# Patient Record
(demographics unavailable — no encounter records)

---

## 2024-11-11 NOTE — PHYSICAL EXAM
[de-identified] : Constitutional:  [    ], alert and oriented, cooperative, in no acute distress.  HEENT  NC/AT.  Appearance: symmetric  Neck/Back Straight without deformity or instability.  Good ROM.  Chest/Respiratory  Respiratory effort: no intercostal retractions or use of accessory muscles. Nonlabored Breathing  Mental Status:  Judgment, insight: intact Orientation: oriented to time, place, and person  Neurological: Sensory and Motor are grossly intact throughout  Right Hip Exam: Inspection/Appearance:      Incision well healed, no erythema or drainage  Tenderness:  	Sacroiliac: [     ]  	Greater trochanter: [     ]                  ADIN Test: [   ]                 FADIR Test: [    ]  Range of Motion:                 Extension - [     ]  	Flexion - [     ]  	IR - [     ]  	ER - [     ]  	Abd - [     ]  	Add - [     ]   Stability: Normal without instability Gait: [     ]  Leg Length Discrepancy:  [     ] cm  Neurologic Exam     Motor intact including 5/5 Extensor Hallucis Longus, 5/5 Flexor Hallucis Longus, 5/5 Tibialis Anterior and 5/5 Gastrocnemius     Sensation Intact to Light Touch including Saphenous, Sural, Superficial Peroneal, Deep Peroneal, Tibial nerve distributions  Vascular Exam     Foot is warm and well perfused with 2+ Dorsalis Pedis Pulse  No pain with range of motion of the left hip or bilateral knees. No lumbar paraspinal muscle tenderness.

## 2024-11-12 NOTE — HISTORY OF PRESENT ILLNESS
[de-identified] : Wendy Hogue is a 83-year-old female presents to the office for follow-up of her right hip intramedullary nail.  Patient underwent right hip IMN on 10/29/2024.  She is currently at rehab.  Patient does have some right hip pain.  Patient was recently brought to the ER due to skin issues from her Lovenox injections.  No falls.  No fevers or chills.

## 2024-11-12 NOTE — DISCUSSION/SUMMARY
[de-identified] : Wendy Hogue is a 83-year-old female who presents to the office for evaluation of her right hip intramedullary nail.  Patient underwent surgery on 10/29/2024.  She is currently at rehab.  X-rays showed right hip intramedullary nail.  Examination showed pain with right hip range of motion.  Discussed with the patient and her son the examination and imaging findings.  Discussed with the patient the recovery of her right hip intramedullary nail, including physical therapy and DVT prophylaxis.  Discussed patient's skin issues due to Lovenox.  Patient may switch to aspirin 325 mg twice per day for a total of 6 weeks for her DVT prophylaxis.  Patient will continue to be weightbearing as tolerated on the right lower extremity.  Discussed evaluation of patient's intramedullary nail.  CT scan of the right hip was ordered.  Discussed that if there is like screw cut out, then revision surgery may be required.  Patient will follow-up in 2 weeks for reevaluation and management.  Patient and her son understanding and in agreement with the plan.  All questions answered.  Plan: -Right lower extremity: Weightbearing as tolerated -DVT prophylaxis: Aspirin 325 mg twice per day for total of 6 weeks -CT scan right hip -Follow-up in 2 weeks for reevaluation and management

## 2024-11-12 NOTE — PHYSICAL EXAM
[de-identified] : Constitutional:  83 year old female, alert and oriented, cooperative, in no acute distress.  HEENT  NC/AT.  Appearance: symmetric  Neck/Back Straight without deformity or instability.  Good ROM.  Chest/Respiratory  Respiratory effort: no intercostal retractions or use of accessory muscles. Nonlabored Breathing  Mental Status:  Judgment, insight: intact Orientation: oriented to time, place, and person  Neurological: Sensory and Motor are grossly intact throughout  Right Hip Exam: Inspection/Appearance:      Incision well healing, no erythema or drainage  Tenderness:                  ADIN Test:  Positive                 FADIR Test: Positive  Range of Motion:                 Extension - 0  	Flexion - 90 	IR - 20 	ER - 30  	Abd - 30  	Add - 30   Neurologic Exam     Motor intact including 5/5 Extensor Hallucis Longus, 5/5 Flexor Hallucis Longus, 5/5 Tibialis Anterior and 5/5 Gastrocnemius     Sensation Intact to Light Touch including Saphenous, Sural, Superficial Peroneal, Deep Peroneal, Tibial nerve distributions  Vascular Exam     Foot is warm and well perfused with 2+ Dorsalis Pedis Pulse  No pain with range of motion of the left hip or bilateral knees. No lumbar paraspinal muscle tenderness. [de-identified] : XRay:  XRays of the Pelvis (1 View) and Right Hip (2 Views) taken in the office today and reviewed with the patient. XRays demonstrate a Right Hip Intramedullary Nail. There has been no significant change in fracture alignment. (my personal interpretation)

## 2024-11-26 NOTE — HISTORY OF PRESENT ILLNESS
[de-identified] : 11/26/2024  Wendy Hogue presents to the office for follow-up of her right hip intramedullary nail.  Patient underwent surgery on 10/29/2024.  She is currently in rehab.  She does have some right hip pain.  Patient underwent her CT scan.  11/12/2024 Wendy Hogue is a 83-year-old female presents to the office for follow-up of her right hip intramedullary nail.  Patient underwent right hip IMN on 10/29/2024.  She is currently at rehab.  Patient does have some right hip pain.  Patient was recently brought to the ER due to skin issues from her Lovenox injections.  No falls.  No fevers or chills.

## 2024-11-26 NOTE — PHYSICAL EXAM
[de-identified] : Constitutional:  83 year old female, alert and oriented, cooperative, in no acute distress.  HEENT  NC/AT.  Appearance: symmetric  Neck/Back Straight without deformity or instability.  Good ROM.  Chest/Respiratory  Respiratory effort: no intercostal retractions or use of accessory muscles. Nonlabored Breathing  Mental Status:  Judgment, insight: intact Orientation: oriented to time, place, and person  Neurological: Sensory and Motor are grossly intact throughout  Right Hip Exam: Inspection/Appearance:      Incision well healing, no erythema or drainage  Tenderness:                  ADIN Test:  Positive                 FADIR Test: Positive  Range of Motion:                 Extension - 0  	Flexion - 90 	IR - 20 	ER - 30  	Abd - 30  	Add - 30   Neurologic Exam     Motor intact including 5/5 Extensor Hallucis Longus, 5/5 Flexor Hallucis Longus, 5/5 Tibialis Anterior and 5/5 Gastrocnemius     Sensation Intact to Light Touch including Saphenous, Sural, Superficial Peroneal, Deep Peroneal, Tibial nerve distributions  Vascular Exam     Foot is warm and well perfused with 2+ Dorsalis Pedis Pulse  No pain with range of motion of the left hip or bilateral knees. No lumbar paraspinal muscle tenderness. [de-identified] : XRay:  XRays of the Pelvis (1 View) and Right Hip (2 Views) taken on 11/12/24. XRays demonstrate a Right Hip Intramedullary Nail. There has been no significant change in fracture alignment. (my personal interpretation)   ACC: [id number]     EXAM:  CT PELVIS BONY ONLY   ORDERED BY: GEN SINGH  PROCEDURE DATE:  11/20/2024    INTERPRETATION:  Exam Type: CT PELVIS BONY Exam Date and Time: 11/20/2024 2:24 PM Indication: Right hip pain. Comparison: Correlate with CT abdomen and pelvis 11/9/2024. Intra-operative x-rays 10/29/2024.  TECHNIQUE: Multiplanar CT images of the pelvis were obtained without contrast. 3-D reformatted images were also obtained for evaluation.  FINDINGS: Demineralized bones. Patient is status post open reduction internal fixation at the right hip consisting of a right femur intramedullary liliya with a gamma nail traversing the femoral head neck region. The hardware is without fracture and positioning appears to be similar as compared to the intra-operative x-rays from 10/29/2024.. Additional traversing screw is seen at the distal aspect of intramedullary liliya. The hardware traverses a right femur comminuted intratrochanteric fracture. There is a presence of surrounding callus formation about the fracture.  There is bilateral hip joint osteoarthritis with multiple ossified intervertebral bodies of the left hip. Degenerative changes at the bilateral sacroiliac joints and pubic symphysis. Partially imaged lumbar spondylosis.  There is subcutaneous edema and swelling about the right lateral hip and the imaged right proximal to mid thigh. Interval decrease in the size of the subcutaneous collection and ill-defined intramuscular hematoma about the right hip region as compared to prior study. Nonspecific thickening of the cutaneous surface is seen along the inner portion of the proximal to mid thigh.  Vascular calcification is present. Calcified uterine fibroid.   IMPRESSION: Status post open reduction internal fixation of the right hip. The hardware is without fracture and positioning appears to be similar as compared to the intra-operative x-rays from 10/29/2024..  --- End of Report ---      RICKY ROSS MD This document has been electronically signed. Nov 20 2024  4:23PM

## 2024-11-26 NOTE — DISCUSSION/SUMMARY
[de-identified] : Wendy Hogue is a 83-year-old female who presents to the office for evaluation of her right hip intramedullary nail.  Patient underwent surgery on 10/29/2024.  She is currently at rehab.  Prior X-rays showed right hip intramedullary nail.  Examination showed pain with right hip range of motion.  Discussed with the patient and her son the examination and imaging findings.  Discussed with the patient the recovery of her right hip intramedullary nail, including physical therapy and DVT prophylaxis.  Discussed patient's skin issues due to Lovenox.  Patient may switch to aspirin 325 mg twice per day for a total of 6 weeks for her DVT prophylaxis.  Patient will continue to be weightbearing as tolerated on the right lower extremity.  Discussed evaluation of patient's intramedullary nail. Discussed operative and nonoperative management of patient's right hip.  Discussed that nonoperative management would be to allow for bony healing and then reevaluation of nail.  Discussed that operative management would consist of removal of nail and hip hemiarthroplasty.  Patient does not want surgery at this time.  Discussed continuing to monitor fracture alignment and screw cut out. Patient will follow-up in 2 weeks for reevaluation and management.  Patient and her son understanding and in agreement with the plan.  All questions answered.  Plan: -Right lower extremity: Weightbearing as tolerated -DVT prophylaxis: Aspirin 325 mg twice per day for total of 6 weeks -Follow-up in 2 weeks for reevaluation and management

## 2024-12-10 NOTE — HISTORY OF PRESENT ILLNESS
[de-identified] : 12/10/2024  Wendy Hogue presents to the office for follow-up of her right hip intramedullary nail.  Patient underwent surgery on 10/29/2024.  She is currently in rehab.  She does have some right lower leg pain.  She is not having significant hip pain.  Patient was recently found to have a DVT while at rehab.  She was restarted on her anticoagulation.  11/26/2024  Wendy Hogue presents to the office for follow-up of her right hip intramedullary nail.  Patient underwent surgery on 10/29/2024.  She is currently in rehab.  She does have some right hip pain.  Patient underwent her CT scan.  11/12/2024 Wendy Hogue is a 83-year-old female presents to the office for follow-up of her right hip intramedullary nail.  Patient underwent right hip IMN on 10/29/2024.  She is currently at rehab.  Patient does have some right hip pain.  Patient was recently brought to the ER due to skin issues from her Lovenox injections.  No falls.  No fevers or chills.

## 2024-12-10 NOTE — PHYSICAL EXAM
[de-identified] : Constitutional:  83 year old female, alert and oriented, cooperative, in no acute distress.  HEENT  NC/AT.  Appearance: symmetric  Neck/Back Straight without deformity or instability.  Good ROM.  Chest/Respiratory  Respiratory effort: no intercostal retractions or use of accessory muscles. Nonlabored Breathing  Mental Status:  Judgment, insight: intact Orientation: oriented to time, place, and person  Neurological: Sensory and Motor are grossly intact throughout  Right Hip Exam: Inspection/Appearance:      Incision well healing, no erythema or drainage  Tenderness:                  ADIN Test:  Positive                 FADIR Test: Positive  Range of Motion:                 Extension - 0  	Flexion - 90 	IR - 20 	ER - 30  	Abd - 30  	Add - 30   Neurologic Exam     Motor intact including 5/5 Extensor Hallucis Longus, 5/5 Flexor Hallucis Longus, 5/5 Tibialis Anterior and 5/5 Gastrocnemius     Sensation Intact to Light Touch including Saphenous, Sural, Superficial Peroneal, Deep Peroneal, Tibial nerve distributions  Vascular Exam     Foot is warm and well perfused with 2+ Dorsalis Pedis Pulse  No pain with range of motion of the left hip or bilateral knees. No lumbar paraspinal muscle tenderness. [de-identified] : XRay:  XRays of the Pelvis (1 View) and Right Hip (2 Views) taken today in the office. XRays demonstrate a Right Hip Intramedullary Nail. There has been no significant change in fracture alignment. (my personal interpretation)   ACC: [id number]     EXAM:  CT PELVIS BONY ONLY   ORDERED BY: GEN SINGH  PROCEDURE DATE:  11/20/2024    INTERPRETATION:  Exam Type: CT PELVIS BONY Exam Date and Time: 11/20/2024 2:24 PM Indication: Right hip pain. Comparison: Correlate with CT abdomen and pelvis 11/9/2024. Intra-operative x-rays 10/29/2024.  TECHNIQUE: Multiplanar CT images of the pelvis were obtained without contrast. 3-D reformatted images were also obtained for evaluation.  FINDINGS: Demineralized bones. Patient is status post open reduction internal fixation at the right hip consisting of a right femur intramedullary liliya with a gamma nail traversing the femoral head neck region. The hardware is without fracture and positioning appears to be similar as compared to the intra-operative x-rays from 10/29/2024.. Additional traversing screw is seen at the distal aspect of intramedullary liliya. The hardware traverses a right femur comminuted intratrochanteric fracture. There is a presence of surrounding callus formation about the fracture.  There is bilateral hip joint osteoarthritis with multiple ossified intervertebral bodies of the left hip. Degenerative changes at the bilateral sacroiliac joints and pubic symphysis. Partially imaged lumbar spondylosis.  There is subcutaneous edema and swelling about the right lateral hip and the imaged right proximal to mid thigh. Interval decrease in the size of the subcutaneous collection and ill-defined intramuscular hematoma about the right hip region as compared to prior study. Nonspecific thickening of the cutaneous surface is seen along the inner portion of the proximal to mid thigh.  Vascular calcification is present. Calcified uterine fibroid.   IMPRESSION: Status post open reduction internal fixation of the right hip. The hardware is without fracture and positioning appears to be similar as compared to the intra-operative x-rays from 10/29/2024..  --- End of Report ---      RICKY ROSS MD This document has been electronically signed. Nov 20 2024  4:23PM

## 2024-12-10 NOTE — DISCUSSION/SUMMARY
[de-identified] : Wendy Hogue is a 83-year-old female who presents to the office for evaluation of her right hip intramedullary nail.  Patient underwent surgery on 10/29/2024.  She is currently at rehab.  Prior X-rays showed right hip intramedullary nail.  Examination showed pain with right hip range of motion.  Discussed with the patient and her son the examination and imaging findings.  Discussed with the patient the recovery of her right hip intramedullary nail, including physical therapy.  Patient will continue to be weightbearing as tolerated on the right lower extremity.  Discussed evaluation of patient's intramedullary nail. Discussed operative and nonoperative management of patient's right hip. Discussed that due to patient's recent DVT, would continue to monitor right hip at this time.  Patient will continue her anticoagulation for her DVT. Patient will follow-up in 4 weeks for reevaluation and management.  Patient and her son understanding and in agreement with the plan.  All questions answered.  Plan: -Right lower extremity: Weightbearing as tolerated -Anticoagulation per Medicine -Follow-up in 4 weeks for reevaluation and management

## 2024-12-10 NOTE — HISTORY OF PRESENT ILLNESS
[de-identified] : 12/10/2024  Wendy Hogue presents to the office for follow-up of her right hip intramedullary nail.  Patient underwent surgery on 10/29/2024.  She is currently in rehab.  She does have some right lower leg pain.  She is not having significant hip pain.  Patient was recently found to have a DVT while at rehab.  She was restarted on her anticoagulation.  11/26/2024  Wendy Hogue presents to the office for follow-up of her right hip intramedullary nail.  Patient underwent surgery on 10/29/2024.  She is currently in rehab.  She does have some right hip pain.  Patient underwent her CT scan.  11/12/2024 Wendy Hogue is a 83-year-old female presents to the office for follow-up of her right hip intramedullary nail.  Patient underwent right hip IMN on 10/29/2024.  She is currently at rehab.  Patient does have some right hip pain.  Patient was recently brought to the ER due to skin issues from her Lovenox injections.  No falls.  No fevers or chills.

## 2024-12-10 NOTE — DISCUSSION/SUMMARY
[de-identified] : Wendy Hogue is a 83-year-old female who presents to the office for evaluation of her right hip intramedullary nail.  Patient underwent surgery on 10/29/2024.  She is currently at rehab.  Prior X-rays showed right hip intramedullary nail.  Examination showed pain with right hip range of motion.  Discussed with the patient and her son the examination and imaging findings.  Discussed with the patient the recovery of her right hip intramedullary nail, including physical therapy.  Patient will continue to be weightbearing as tolerated on the right lower extremity.  Discussed evaluation of patient's intramedullary nail. Discussed operative and nonoperative management of patient's right hip. Discussed that due to patient's recent DVT, would continue to monitor right hip at this time.  Patient will continue her anticoagulation for her DVT. Patient will follow-up in 4 weeks for reevaluation and management.  Patient and her son understanding and in agreement with the plan.  All questions answered.  Plan: -Right lower extremity: Weightbearing as tolerated -Anticoagulation per Medicine -Follow-up in 4 weeks for reevaluation and management

## 2024-12-10 NOTE — PHYSICAL EXAM
[de-identified] : Constitutional:  83 year old female, alert and oriented, cooperative, in no acute distress.  HEENT  NC/AT.  Appearance: symmetric  Neck/Back Straight without deformity or instability.  Good ROM.  Chest/Respiratory  Respiratory effort: no intercostal retractions or use of accessory muscles. Nonlabored Breathing  Mental Status:  Judgment, insight: intact Orientation: oriented to time, place, and person  Neurological: Sensory and Motor are grossly intact throughout  Right Hip Exam: Inspection/Appearance:      Incision well healing, no erythema or drainage  Tenderness:                  ADIN Test:  Positive                 FADIR Test: Positive  Range of Motion:                 Extension - 0  	Flexion - 90 	IR - 20 	ER - 30  	Abd - 30  	Add - 30   Neurologic Exam     Motor intact including 5/5 Extensor Hallucis Longus, 5/5 Flexor Hallucis Longus, 5/5 Tibialis Anterior and 5/5 Gastrocnemius     Sensation Intact to Light Touch including Saphenous, Sural, Superficial Peroneal, Deep Peroneal, Tibial nerve distributions  Vascular Exam     Foot is warm and well perfused with 2+ Dorsalis Pedis Pulse  No pain with range of motion of the left hip or bilateral knees. No lumbar paraspinal muscle tenderness. [de-identified] : XRay:  XRays of the Pelvis (1 View) and Right Hip (2 Views) taken today in the office. XRays demonstrate a Right Hip Intramedullary Nail. There has been no significant change in fracture alignment. (my personal interpretation)   ACC: [id number]     EXAM:  CT PELVIS BONY ONLY   ORDERED BY: GEN SINGH  PROCEDURE DATE:  11/20/2024    INTERPRETATION:  Exam Type: CT PELVIS BONY Exam Date and Time: 11/20/2024 2:24 PM Indication: Right hip pain. Comparison: Correlate with CT abdomen and pelvis 11/9/2024. Intra-operative x-rays 10/29/2024.  TECHNIQUE: Multiplanar CT images of the pelvis were obtained without contrast. 3-D reformatted images were also obtained for evaluation.  FINDINGS: Demineralized bones. Patient is status post open reduction internal fixation at the right hip consisting of a right femur intramedullary liliya with a gamma nail traversing the femoral head neck region. The hardware is without fracture and positioning appears to be similar as compared to the intra-operative x-rays from 10/29/2024.. Additional traversing screw is seen at the distal aspect of intramedullary liliya. The hardware traverses a right femur comminuted intratrochanteric fracture. There is a presence of surrounding callus formation about the fracture.  There is bilateral hip joint osteoarthritis with multiple ossified intervertebral bodies of the left hip. Degenerative changes at the bilateral sacroiliac joints and pubic symphysis. Partially imaged lumbar spondylosis.  There is subcutaneous edema and swelling about the right lateral hip and the imaged right proximal to mid thigh. Interval decrease in the size of the subcutaneous collection and ill-defined intramuscular hematoma about the right hip region as compared to prior study. Nonspecific thickening of the cutaneous surface is seen along the inner portion of the proximal to mid thigh.  Vascular calcification is present. Calcified uterine fibroid.   IMPRESSION: Status post open reduction internal fixation of the right hip. The hardware is without fracture and positioning appears to be similar as compared to the intra-operative x-rays from 10/29/2024..  --- End of Report ---      RICKY ROSS MD This document has been electronically signed. Nov 20 2024  4:23PM

## 2025-01-28 NOTE — PHYSICAL EXAM
[de-identified] : Constitutional:  83 year old female, alert and oriented, cooperative, in no acute distress.  HEENT  NC/AT.  Appearance: symmetric  Neck/Back Straight without deformity or instability.  Good ROM.  Chest/Respiratory  Respiratory effort: no intercostal retractions or use of accessory muscles. Nonlabored Breathing  Mental Status:  Judgment, insight: intact Orientation: oriented to time, place, and person  Neurological: Sensory and Motor are grossly intact throughout  Right Hip Exam: Inspection/Appearance:      Incision well healing, no erythema or drainage  Tenderness:                  ADIN Test:  Negative                 FADIR Test: Negative  Range of Motion:                 Extension - 0  	Flexion - 90 	IR - 20 	ER - 30  	Abd - 30  	Add - 30   Neurologic Exam     Motor intact including 5/5 Extensor Hallucis Longus, 5/5 Flexor Hallucis Longus, 5/5 Tibialis Anterior and 5/5 Gastrocnemius     Sensation Intact to Light Touch including Saphenous, Sural, Superficial Peroneal, Deep Peroneal, Tibial nerve distributions  Vascular Exam     Foot is warm and well perfused with 2+ Dorsalis Pedis Pulse  No pain with range of motion of the left hip or bilateral knees. No lumbar paraspinal muscle tenderness. [de-identified] : XRay:  XRays of the Pelvis (1 View) and Right Hip (2 Views) taken today in the office. XRays demonstrate a Right Hip Intramedullary Nail. There has been no significant change in fracture alignment. (my personal interpretation)   ACC: [id number]     EXAM:  CT PELVIS BONY ONLY   ORDERED BY: GEN SINGH  PROCEDURE DATE:  11/20/2024    INTERPRETATION:  Exam Type: CT PELVIS BONY Exam Date and Time: 11/20/2024 2:24 PM Indication: Right hip pain. Comparison: Correlate with CT abdomen and pelvis 11/9/2024. Intra-operative x-rays 10/29/2024.  TECHNIQUE: Multiplanar CT images of the pelvis were obtained without contrast. 3-D reformatted images were also obtained for evaluation.  FINDINGS: Demineralized bones. Patient is status post open reduction internal fixation at the right hip consisting of a right femur intramedullary liliya with a gamma nail traversing the femoral head neck region. The hardware is without fracture and positioning appears to be similar as compared to the intra-operative x-rays from 10/29/2024.. Additional traversing screw is seen at the distal aspect of intramedullary liliya. The hardware traverses a right femur comminuted intratrochanteric fracture. There is a presence of surrounding callus formation about the fracture.  There is bilateral hip joint osteoarthritis with multiple ossified intervertebral bodies of the left hip. Degenerative changes at the bilateral sacroiliac joints and pubic symphysis. Partially imaged lumbar spondylosis.  There is subcutaneous edema and swelling about the right lateral hip and the imaged right proximal to mid thigh. Interval decrease in the size of the subcutaneous collection and ill-defined intramuscular hematoma about the right hip region as compared to prior study. Nonspecific thickening of the cutaneous surface is seen along the inner portion of the proximal to mid thigh.  Vascular calcification is present. Calcified uterine fibroid.   IMPRESSION: Status post open reduction internal fixation of the right hip. The hardware is without fracture and positioning appears to be similar as compared to the intra-operative x-rays from 10/29/2024..  --- End of Report ---      RICKY ROSS MD This document has been electronically signed. Nov 20 2024  4:23PM

## 2025-01-28 NOTE — DISCUSSION/SUMMARY
[de-identified] : Wendy Hogue is a 83-year-old female who presents to the office for evaluation of her right hip intramedullary nail.  Patient underwent surgery on 10/29/2024.  She is currently at rehab.  Prior X-rays showed right hip intramedullary nail.  Examination showed pain with right hip range of motion.  Discussed with the patient and her son the examination and imaging findings.  Discussed with the patient the recovery of her right hip intramedullary nail, including home exercises.  Patient will continue to be weightbearing as tolerated on the right lower extremity.  Discussed evaluation of patient's intramedullary nail. Discussed operative and nonoperative management of patient's right hip. Discussed consideration of exchanging lag screw for shorter screw. Patient does not want surgery at this time. Patient will follow-up in 3 months for reevaluation and management.  Patient and her son understanding and in agreement with the plan.  All questions answered.  Plan: -Right lower extremity: Weightbearing as tolerated -Anticoagulation per Medicine -Follow-up in 3 months for reevaluation and management

## 2025-01-28 NOTE — HISTORY OF PRESENT ILLNESS
[de-identified] : 1/28/2025  Wendy Hogue presents to the office for follow-up of her right hip intramedullary nail.  Patient states that she does not have significant right hip pain at this time.  No falls.  No fevers or chills.  12/10/2024  Wendy Hogue presents to the office for follow-up of her right hip intramedullary nail.  Patient underwent surgery on 10/29/2024.  She is currently in rehab.  She does have some right lower leg pain.  She is not having significant hip pain.  Patient was recently found to have a DVT while at rehab.  She was restarted on her anticoagulation.  11/26/2024  Wendy Hogue presents to the office for follow-up of her right hip intramedullary nail.  Patient underwent surgery on 10/29/2024.  She is currently in rehab.  She does have some right hip pain.  Patient underwent her CT scan.  11/12/2024 Wendy Hogue is a 83-year-old female presents to the office for follow-up of her right hip intramedullary nail.  Patient underwent right hip IMN on 10/29/2024.  She is currently at rehab.  Patient does have some right hip pain.  Patient was recently brought to the ER due to skin issues from her Lovenox injections.  No falls.  No fevers or chills.

## 2025-01-28 NOTE — HISTORY OF PRESENT ILLNESS
[de-identified] : 1/28/2025  Wendy Hogue presents to the office for follow-up of her right hip intramedullary nail.  Patient states that she does not have significant right hip pain at this time.  No falls.  No fevers or chills.  12/10/2024  Wendy Hogue presents to the office for follow-up of her right hip intramedullary nail.  Patient underwent surgery on 10/29/2024.  She is currently in rehab.  She does have some right lower leg pain.  She is not having significant hip pain.  Patient was recently found to have a DVT while at rehab.  She was restarted on her anticoagulation.  11/26/2024  Wendy Hogue presents to the office for follow-up of her right hip intramedullary nail.  Patient underwent surgery on 10/29/2024.  She is currently in rehab.  She does have some right hip pain.  Patient underwent her CT scan.  11/12/2024 Wendy Hogue is a 83-year-old female presents to the office for follow-up of her right hip intramedullary nail.  Patient underwent right hip IMN on 10/29/2024.  She is currently at rehab.  Patient does have some right hip pain.  Patient was recently brought to the ER due to skin issues from her Lovenox injections.  No falls.  No fevers or chills.

## 2025-01-28 NOTE — PHYSICAL EXAM
[de-identified] : Constitutional:  83 year old female, alert and oriented, cooperative, in no acute distress.  HEENT  NC/AT.  Appearance: symmetric  Neck/Back Straight without deformity or instability.  Good ROM.  Chest/Respiratory  Respiratory effort: no intercostal retractions or use of accessory muscles. Nonlabored Breathing  Mental Status:  Judgment, insight: intact Orientation: oriented to time, place, and person  Neurological: Sensory and Motor are grossly intact throughout  Right Hip Exam: Inspection/Appearance:      Incision well healing, no erythema or drainage  Tenderness:                  ADIN Test:  Negative                 FADIR Test: Negative  Range of Motion:                 Extension - 0  	Flexion - 90 	IR - 20 	ER - 30  	Abd - 30  	Add - 30   Neurologic Exam     Motor intact including 5/5 Extensor Hallucis Longus, 5/5 Flexor Hallucis Longus, 5/5 Tibialis Anterior and 5/5 Gastrocnemius     Sensation Intact to Light Touch including Saphenous, Sural, Superficial Peroneal, Deep Peroneal, Tibial nerve distributions  Vascular Exam     Foot is warm and well perfused with 2+ Dorsalis Pedis Pulse  No pain with range of motion of the left hip or bilateral knees. No lumbar paraspinal muscle tenderness. [de-identified] : XRay:  XRays of the Pelvis (1 View) and Right Hip (2 Views) taken today in the office. XRays demonstrate a Right Hip Intramedullary Nail. There has been no significant change in fracture alignment. (my personal interpretation)   ACC: [id number]     EXAM:  CT PELVIS BONY ONLY   ORDERED BY: GEN SINGH  PROCEDURE DATE:  11/20/2024    INTERPRETATION:  Exam Type: CT PELVIS BONY Exam Date and Time: 11/20/2024 2:24 PM Indication: Right hip pain. Comparison: Correlate with CT abdomen and pelvis 11/9/2024. Intra-operative x-rays 10/29/2024.  TECHNIQUE: Multiplanar CT images of the pelvis were obtained without contrast. 3-D reformatted images were also obtained for evaluation.  FINDINGS: Demineralized bones. Patient is status post open reduction internal fixation at the right hip consisting of a right femur intramedullary liliya with a gamma nail traversing the femoral head neck region. The hardware is without fracture and positioning appears to be similar as compared to the intra-operative x-rays from 10/29/2024.. Additional traversing screw is seen at the distal aspect of intramedullary liliya. The hardware traverses a right femur comminuted intratrochanteric fracture. There is a presence of surrounding callus formation about the fracture.  There is bilateral hip joint osteoarthritis with multiple ossified intervertebral bodies of the left hip. Degenerative changes at the bilateral sacroiliac joints and pubic symphysis. Partially imaged lumbar spondylosis.  There is subcutaneous edema and swelling about the right lateral hip and the imaged right proximal to mid thigh. Interval decrease in the size of the subcutaneous collection and ill-defined intramuscular hematoma about the right hip region as compared to prior study. Nonspecific thickening of the cutaneous surface is seen along the inner portion of the proximal to mid thigh.  Vascular calcification is present. Calcified uterine fibroid.   IMPRESSION: Status post open reduction internal fixation of the right hip. The hardware is without fracture and positioning appears to be similar as compared to the intra-operative x-rays from 10/29/2024..  --- End of Report ---      RICKY ROSS MD This document has been electronically signed. Nov 20 2024  4:23PM

## 2025-01-28 NOTE — DISCUSSION/SUMMARY
[de-identified] : Wendy Hogue is a 83-year-old female who presents to the office for evaluation of her right hip intramedullary nail.  Patient underwent surgery on 10/29/2024.  She is currently at rehab.  Prior X-rays showed right hip intramedullary nail.  Examination showed pain with right hip range of motion.  Discussed with the patient and her son the examination and imaging findings.  Discussed with the patient the recovery of her right hip intramedullary nail, including home exercises.  Patient will continue to be weightbearing as tolerated on the right lower extremity.  Discussed evaluation of patient's intramedullary nail. Discussed operative and nonoperative management of patient's right hip. Discussed consideration of exchanging lag screw for shorter screw. Patient does not want surgery at this time. Patient will follow-up in 3 months for reevaluation and management.  Patient and her son understanding and in agreement with the plan.  All questions answered.  Plan: -Right lower extremity: Weightbearing as tolerated -Anticoagulation per Medicine -Follow-up in 3 months for reevaluation and management

## 2025-03-27 NOTE — HISTORY OF PRESENT ILLNESS
[de-identified] : 3/27/2025  Wendy Hogue presented to the office for evaluation of her right knee pain. Patient has been experiencing right knee pain. The pain can decrease her walking. She has tried Tylenol.  1/28/2025  Wendy Hogue presents to the office for follow-up of her right hip intramedullary nail.  Patient states that she does not have significant right hip pain at this time.  No falls.  No fevers or chills.  12/10/2024  Wendy Hogue presents to the office for follow-up of her right hip intramedullary nail.  Patient underwent surgery on 10/29/2024.  She is currently in rehab.  She does have some right lower leg pain.  She is not having significant hip pain.  Patient was recently found to have a DVT while at rehab.  She was restarted on her anticoagulation.  11/26/2024  Wendy Hogue presents to the office for follow-up of her right hip intramedullary nail.  Patient underwent surgery on 10/29/2024.  She is currently in rehab.  She does have some right hip pain.  Patient underwent her CT scan.  11/12/2024 Wendy Hogue is a 83-year-old female presents to the office for follow-up of her right hip intramedullary nail.  Patient underwent right hip IMN on 10/29/2024.  She is currently at rehab.  Patient does have some right hip pain.  Patient was recently brought to the ER due to skin issues from her Lovenox injections.  No falls.  No fevers or chills.

## 2025-03-27 NOTE — DISCUSSION/SUMMARY
[de-identified] : Wendy Hogue is a 83 year old female who presented to the office for evaluation of her right knee pain. XRays showed right knee osteoarthritis. Examination showed decreased range of motion. Discussed with the patient the examination and imaging findings. Discussed the nonoperative management of knee osteoarthritis, including physical therapy, anti-inflammatories, and injections.  Patient was given referral for physical therapy.  Patient would like a knee corticosteroid injection today.  Discussed the risks of corticosteroid injections.  Patient was given a Right knee corticosteroid injection using aseptic technique.  Patient tolerated the procedure well.  Patient will follow-up in 3 months for reevaluation and management.  Patient understanding and in agreement the plan.  All questions answered.  Plan: -Right Knee Corticosteroid Injection Given -Physical Therapy -Follow up in 3 months for reevaluation and management  Procedure Note: Right knee corticosteroid injection  A Right Knee corticosteroid injection was given today. Risk and benefits of the injection were discussed, including but not limited to infection, fat atrophy, skin discoloration, failure to achieve desired results and elevated blood sugars.  The area was prepped in the usual sterile fashion. The injection was given with 1 cc (40 mg) Methylprednisolone and 4 cc 1% Lidocaine and the patient tolerated it well.   Risk of cortisone injection are minimal but present. There is the rare risk of joint infection, skin and soft tissue thinning or whitening of the skin surrounding the injection site, thinning of nearby bone, or the risk of elevated blood glucose in diabetics. Diabetics receiving steroid injection should follow their blood sugars very closely for several days after receiving the injections.  In the event of uncontrolled elevated blood glucose, they should seek medical attention.  There's some concern that repeated use of cortisone shots may cause deterioration of the cartilage within a joint. For this reason, doctors typically limit the number of cortisone shots in a joint. The limit varies depending on the joint and the reason for treatment.  Cortisone shots usually include a corticosteroid medication and a local anesthetic. The local anesthetic helps to numb the joint at the time of the injection and last for several hours. The cortisone acts to relieve pain and inflammation but may take several days to begin to work. Some people do experience a cortisone flare after the injection and may have increased pain for 2 to 3 days after the injection, and then pain can begin to improve.  Patient was instructed to call or return for any signs or symptoms of infection after an injection including increased pain, swelling, redness or fevers.

## 2025-03-27 NOTE — PHYSICAL EXAM
[de-identified] : Constitutional:  83 year old female, alert and oriented, cooperative, in no acute distress.  HEENT  NC/AT.  Appearance: symmetric  Neck/Back Straight without deformity or instability.  Good ROM.  Chest/Respiratory  Respiratory effort: no intercostal retractions or use of accessory muscles. Nonlabored Breathing  Skin  On inspection, warm and dry without rashes or lesions.  Mental Status:  Judgment, insight: intact Orientation: oriented to time, place, and person  Neurological: Sensory and Motor are grossly intact throughout  Right Knee  Inspection:     Skin intact, no rashes or lesions     No Effusion     Non-tender to palpation over tibial tubercle, patella, medial and lateral joint line, and pes insertion.  Range of Motion: 	Extension - 0 degrees 	Flexion - 90 degrees 	Extensor lag: None  Stability:      Demonstrates no Varus or Valgus instability      Negative Anterior or Posterior drawer.      Negative Lachman's  Patella: stable, tracks well.   Neurologic Exam     Motor intact including 5/5 Extensor Hallucis Longus, 5/5 Flexor Hallucis Longus, 5/5 Tibialis Anterior and 5/5 Gastrocnemius     Sensation Intact to Light Touch including Saphenous, Sural, Superficial Peroneal, Deep Peroneal, Tibial nerve distributions  Vascular Exam     Foot is warm and well perfused with 2+ Dorsalis Pedis Pulse   No pain with range of motion of the bilateral hips or left knee. No lumbar paraspinal muscle tenderness. [de-identified] : XRay: XRays of the Right Knee (4 Views) taken in the office today and reviewed with the patient. XRays demonstrate tricompartmental joint space narrowing, with bone on bone articulations in the medial compartment, with subchondral sclerosis, overlying osteophytes, all consistent with severe osteoarthritis, KL Grade: 4.(my personal interpretation)  XRay:  XRays of the Pelvis (1 View) and Right Hip (2 Views) taken on 1/28/2025. XRays demonstrate a Right Hip Intramedullary Nail. There has been no significant change in fracture alignment. (my personal interpretation)   ACC: [id number]     EXAM:  CT PELVIS BONY ONLY   ORDERED BY: GEN SINGH  PROCEDURE DATE:  11/20/2024    INTERPRETATION:  Exam Type: CT PELVIS BONY Exam Date and Time: 11/20/2024 2:24 PM Indication: Right hip pain. Comparison: Correlate with CT abdomen and pelvis 11/9/2024. Intra-operative x-rays 10/29/2024.  TECHNIQUE: Multiplanar CT images of the pelvis were obtained without contrast. 3-D reformatted images were also obtained for evaluation.  FINDINGS: Demineralized bones. Patient is status post open reduction internal fixation at the right hip consisting of a right femur intramedullary liliya with a gamma nail traversing the femoral head neck region. The hardware is without fracture and positioning appears to be similar as compared to the intra-operative x-rays from 10/29/2024.. Additional traversing screw is seen at the distal aspect of intramedullary liliya. The hardware traverses a right femur comminuted intratrochanteric fracture. There is a presence of surrounding callus formation about the fracture.  There is bilateral hip joint osteoarthritis with multiple ossified intervertebral bodies of the left hip. Degenerative changes at the bilateral sacroiliac joints and pubic symphysis. Partially imaged lumbar spondylosis.  There is subcutaneous edema and swelling about the right lateral hip and the imaged right proximal to mid thigh. Interval decrease in the size of the subcutaneous collection and ill-defined intramuscular hematoma about the right hip region as compared to prior study. Nonspecific thickening of the cutaneous surface is seen along the inner portion of the proximal to mid thigh.  Vascular calcification is present. Calcified uterine fibroid.   IMPRESSION: Status post open reduction internal fixation of the right hip. The hardware is without fracture and positioning appears to be similar as compared to the intra-operative x-rays from 10/29/2024..  --- End of Report ---      RICKY ROSS MD This document has been electronically signed. Nov 20 2024  4:23PM

## 2025-04-24 NOTE — DISCUSSION/SUMMARY
[de-identified] : Wendy Hogue is a 83 year old female who presented to the office for evaluation of her right knee pain. XRays showed right knee osteoarthritis. Examination showed decreased range of motion. Discussed with the patient the examination and imaging findings. Discussed the nonoperative management of knee osteoarthritis, including physical therapy, anti-inflammatories, and injections.  Patient was given referral for physical therapy.  Patient would like a knee corticosteroid injection today.  Discussed the risks of corticosteroid injections.  Patient was given a Right knee corticosteroid injection using aseptic technique.  Patient tolerated the procedure well.  Patient will follow-up in 3 months for reevaluation and management.  Patient understanding and in agreement the plan.  All questions answered.  Plan: -Right Knee Corticosteroid Injection Given -Physical Therapy -Follow up in 3 months for reevaluation and management  Procedure Note: Right knee corticosteroid injection  A Right Knee corticosteroid injection was given today. Risk and benefits of the injection were discussed, including but not limited to infection, fat atrophy, skin discoloration, failure to achieve desired results and elevated blood sugars.  The area was prepped in the usual sterile fashion. The injection was given with 1 cc (40 mg) Methylprednisolone and 4 cc 1% Lidocaine and the patient tolerated it well.   Risk of cortisone injection are minimal but present. There is the rare risk of joint infection, skin and soft tissue thinning or whitening of the skin surrounding the injection site, thinning of nearby bone, or the risk of elevated blood glucose in diabetics. Diabetics receiving steroid injection should follow their blood sugars very closely for several days after receiving the injections.  In the event of uncontrolled elevated blood glucose, they should seek medical attention.  There's some concern that repeated use of cortisone shots may cause deterioration of the cartilage within a joint. For this reason, doctors typically limit the number of cortisone shots in a joint. The limit varies depending on the joint and the reason for treatment.  Cortisone shots usually include a corticosteroid medication and a local anesthetic. The local anesthetic helps to numb the joint at the time of the injection and last for several hours. The cortisone acts to relieve pain and inflammation but may take several days to begin to work. Some people do experience a cortisone flare after the injection and may have increased pain for 2 to 3 days after the injection, and then pain can begin to improve.  Patient was instructed to call or return for any signs or symptoms of infection after an injection including increased pain, swelling, redness or fevers.

## 2025-04-24 NOTE — PHYSICAL EXAM
[de-identified] : Constitutional:  83 year old female, alert and oriented, cooperative, in no acute distress.  HEENT  NC/AT.  Appearance: symmetric  Neck/Back Straight without deformity or instability.  Good ROM.  Chest/Respiratory  Respiratory effort: no intercostal retractions or use of accessory muscles. Nonlabored Breathing  Skin  On inspection, warm and dry without rashes or lesions.  Mental Status:  Judgment, insight: intact Orientation: oriented to time, place, and person  Neurological: Sensory and Motor are grossly intact throughout  Right Knee  Inspection:     Skin intact, no rashes or lesions     No Effusion     Non-tender to palpation over tibial tubercle, patella, medial and lateral joint line, and pes insertion.  Range of Motion: 	Extension - 0 degrees 	Flexion - 90 degrees 	Extensor lag: None  Stability:      Demonstrates no Varus or Valgus instability      Negative Anterior or Posterior drawer.      Negative Lachman's  Patella: stable, tracks well.   Neurologic Exam     Motor intact including 5/5 Extensor Hallucis Longus, 5/5 Flexor Hallucis Longus, 5/5 Tibialis Anterior and 5/5 Gastrocnemius     Sensation Intact to Light Touch including Saphenous, Sural, Superficial Peroneal, Deep Peroneal, Tibial nerve distributions  Vascular Exam     Foot is warm and well perfused with 2+ Dorsalis Pedis Pulse   No pain with range of motion of the bilateral hips or left knee. No lumbar paraspinal muscle tenderness. [de-identified] : XRay: XRays of the Right Knee (4 Views) taken on 3/27/2025. XRays demonstrate tricompartmental joint space narrowing, with bone on bone articulations in the medial compartment, with subchondral sclerosis, overlying osteophytes, all consistent with severe osteoarthritis, KL Grade: 4.(my personal interpretation)  XRay:  XRays of the Pelvis (1 View) and Right Hip (2 Views) taken on 1/28/2025. XRays demonstrate a Right Hip Intramedullary Nail. There has been no significant change in fracture alignment. (my personal interpretation)   ACC: [id number]     EXAM:  CT PELVIS BONY ONLY   ORDERED BY: GEN SINGH  PROCEDURE DATE:  11/20/2024    INTERPRETATION:  Exam Type: CT PELVIS BONY Exam Date and Time: 11/20/2024 2:24 PM Indication: Right hip pain. Comparison: Correlate with CT abdomen and pelvis 11/9/2024. Intra-operative x-rays 10/29/2024.  TECHNIQUE: Multiplanar CT images of the pelvis were obtained without contrast. 3-D reformatted images were also obtained for evaluation.  FINDINGS: Demineralized bones. Patient is status post open reduction internal fixation at the right hip consisting of a right femur intramedullary liliya with a gamma nail traversing the femoral head neck region. The hardware is without fracture and positioning appears to be similar as compared to the intra-operative x-rays from 10/29/2024.. Additional traversing screw is seen at the distal aspect of intramedullary liliya. The hardware traverses a right femur comminuted intratrochanteric fracture. There is a presence of surrounding callus formation about the fracture.  There is bilateral hip joint osteoarthritis with multiple ossified intervertebral bodies of the left hip. Degenerative changes at the bilateral sacroiliac joints and pubic symphysis. Partially imaged lumbar spondylosis.  There is subcutaneous edema and swelling about the right lateral hip and the imaged right proximal to mid thigh. Interval decrease in the size of the subcutaneous collection and ill-defined intramuscular hematoma about the right hip region as compared to prior study. Nonspecific thickening of the cutaneous surface is seen along the inner portion of the proximal to mid thigh.  Vascular calcification is present. Calcified uterine fibroid.   IMPRESSION: Status post open reduction internal fixation of the right hip. The hardware is without fracture and positioning appears to be similar as compared to the intra-operative x-rays from 10/29/2024..  --- End of Report ---      RICKY ROSS MD This document has been electronically signed. Nov 20 2024  4:23PM

## 2025-07-03 NOTE — PHYSICAL EXAM
[de-identified] : Constitutional:  83 year old female, alert and oriented, cooperative, in no acute distress.  HEENT  NC/AT.  Appearance: symmetric  Neck/Back Straight without deformity or instability.  Good ROM.  Chest/Respiratory  Respiratory effort: no intercostal retractions or use of accessory muscles. Nonlabored Breathing  Skin  On inspection, warm and dry without rashes or lesions.  Mental Status:  Judgment, insight: intact Orientation: oriented to time, place, and person  Neurological: Sensory and Motor are grossly intact throughout  Right Knee  Inspection:     Skin intact, no rashes or lesions     No Effusion     Non-tender to palpation over tibial tubercle, patella, medial and lateral joint line, and pes insertion.  Range of Motion: 	Extension - 0 degrees 	Flexion - 90 degrees 	Extensor lag: None  Stability:      Demonstrates no Varus or Valgus instability      Negative Anterior or Posterior drawer.      Negative Lachman's  Patella: stable, tracks well.   Neurologic Exam     Motor intact including 5/5 Extensor Hallucis Longus, 5/5 Flexor Hallucis Longus, 5/5 Tibialis Anterior and 5/5 Gastrocnemius     Sensation Intact to Light Touch including Saphenous, Sural, Superficial Peroneal, Deep Peroneal, Tibial nerve distributions  Vascular Exam     Foot is warm and well perfused with 2+ Dorsalis Pedis Pulse   No pain with range of motion of the bilateral hips or left knee. No lumbar paraspinal muscle tenderness. [de-identified] : XRay: XRays of the Right Knee (4 Views) taken on 3/27/2025. XRays demonstrate tricompartmental joint space narrowing, with bone on bone articulations in the medial compartment, with subchondral sclerosis, overlying osteophytes, all consistent with severe osteoarthritis, KL Grade: 4. (my personal interpretation)  XRay:  XRays of the Pelvis (1 View) and Right Hip (2 Views) taken on 1/28/2025. XRays demonstrate a Right Hip Intramedullary Nail. There has been no significant change in fracture alignment. (my personal interpretation)   ACC: [id number]     EXAM:  CT PELVIS BONY ONLY   ORDERED BY: GEN SINGH  PROCEDURE DATE:  11/20/2024    INTERPRETATION:  Exam Type: CT PELVIS BONY Exam Date and Time: 11/20/2024 2:24 PM Indication: Right hip pain. Comparison: Correlate with CT abdomen and pelvis 11/9/2024. Intra-operative x-rays 10/29/2024.  TECHNIQUE: Multiplanar CT images of the pelvis were obtained without contrast. 3-D reformatted images were also obtained for evaluation.  FINDINGS: Demineralized bones. Patient is status post open reduction internal fixation at the right hip consisting of a right femur intramedullary liliya with a gamma nail traversing the femoral head neck region. The hardware is without fracture and positioning appears to be similar as compared to the intra-operative x-rays from 10/29/2024.. Additional traversing screw is seen at the distal aspect of intramedullary liliya. The hardware traverses a right femur comminuted intratrochanteric fracture. There is a presence of surrounding callus formation about the fracture.  There is bilateral hip joint osteoarthritis with multiple ossified intervertebral bodies of the left hip. Degenerative changes at the bilateral sacroiliac joints and pubic symphysis. Partially imaged lumbar spondylosis.  There is subcutaneous edema and swelling about the right lateral hip and the imaged right proximal to mid thigh. Interval decrease in the size of the subcutaneous collection and ill-defined intramuscular hematoma about the right hip region as compared to prior study. Nonspecific thickening of the cutaneous surface is seen along the inner portion of the proximal to mid thigh.  Vascular calcification is present. Calcified uterine fibroid.   IMPRESSION: Status post open reduction internal fixation of the right hip. The hardware is without fracture and positioning appears to be similar as compared to the intra-operative x-rays from 10/29/2024..  --- End of Report ---      RICKY ROSS MD This document has been electronically signed. Nov 20 2024  4:23PM

## 2025-07-03 NOTE — HISTORY OF PRESENT ILLNESS
[de-identified] : 7/3/2025   3/27/2025  Wendy Hogue presented to the office for evaluation of her right knee pain. Patient has been experiencing right knee pain. The pain can decrease her walking. She has tried Tylenol.  1/28/2025  Wendy Hogue presents to the office for follow-up of her right hip intramedullary nail.  Patient states that she does not have significant right hip pain at this time.  No falls.  No fevers or chills.  12/10/2024  Wendy Hogue presents to the office for follow-up of her right hip intramedullary nail.  Patient underwent surgery on 10/29/2024.  She is currently in rehab.  She does have some right lower leg pain.  She is not having significant hip pain.  Patient was recently found to have a DVT while at rehab.  She was restarted on her anticoagulation.  11/26/2024  Wendy Hogue presents to the office for follow-up of her right hip intramedullary nail.  Patient underwent surgery on 10/29/2024.  She is currently in rehab.  She does have some right hip pain.  Patient underwent her CT scan.  11/12/2024 Wendy Hogue is a 83-year-old female presents to the office for follow-up of her right hip intramedullary nail.  Patient underwent right hip IMN on 10/29/2024.  She is currently at rehab.  Patient does have some right hip pain.  Patient was recently brought to the ER due to skin issues from her Lovenox injections.  No falls.  No fevers or chills.

## 2025-07-03 NOTE — DISCUSSION/SUMMARY
[de-identified] : Wendy Hogue is a 83 year old female who presented to the office for evaluation of her right knee pain. XRays showed right knee osteoarthritis. Examination showed decreased range of motion. Discussed with the patient the examination and imaging findings. Discussed the nonoperative management of knee osteoarthritis, including physical therapy, anti-inflammatories, and injections.  Patient was given referral for physical therapy.  Patient would like a knee corticosteroid injection today.  Discussed the risks of corticosteroid injections.  Patient was given a Right knee corticosteroid injection using aseptic technique.  Patient tolerated the procedure well.  Patient will follow-up in 3 months for reevaluation and management.  Patient understanding and in agreement the plan.  All questions answered.  Plan: -Right Knee Corticosteroid Injection Given -Physical Therapy -Follow up in 3 months for reevaluation and management  Procedure Note: Right knee corticosteroid injection  A Right Knee corticosteroid injection was given today. Risk and benefits of the injection were discussed, including but not limited to infection, fat atrophy, skin discoloration, failure to achieve desired results and elevated blood sugars.  The area was prepped in the usual sterile fashion. The injection was given with 1 cc (40 mg) Methylprednisolone and 4 cc 1% Lidocaine and the patient tolerated it well.   Risk of cortisone injection are minimal but present. There is the rare risk of joint infection, skin and soft tissue thinning or whitening of the skin surrounding the injection site, thinning of nearby bone, or the risk of elevated blood glucose in diabetics. Diabetics receiving steroid injection should follow their blood sugars very closely for several days after receiving the injections.  In the event of uncontrolled elevated blood glucose, they should seek medical attention.  There's some concern that repeated use of cortisone shots may cause deterioration of the cartilage within a joint. For this reason, doctors typically limit the number of cortisone shots in a joint. The limit varies depending on the joint and the reason for treatment.  Cortisone shots usually include a corticosteroid medication and a local anesthetic. The local anesthetic helps to numb the joint at the time of the injection and last for several hours. The cortisone acts to relieve pain and inflammation but may take several days to begin to work. Some people do experience a cortisone flare after the injection and may have increased pain for 2 to 3 days after the injection, and then pain can begin to improve.  Patient was instructed to call or return for any signs or symptoms of infection after an injection including increased pain, swelling, redness or fevers.